# Patient Record
Sex: FEMALE | Race: WHITE | NOT HISPANIC OR LATINO | Employment: STUDENT | URBAN - METROPOLITAN AREA
[De-identification: names, ages, dates, MRNs, and addresses within clinical notes are randomized per-mention and may not be internally consistent; named-entity substitution may affect disease eponyms.]

---

## 2022-10-28 ENCOUNTER — OFFICE VISIT (OUTPATIENT)
Dept: GASTROENTEROLOGY | Facility: CLINIC | Age: 22
End: 2022-10-28

## 2022-10-28 VITALS
WEIGHT: 168 LBS | BODY MASS INDEX: 28.68 KG/M2 | HEIGHT: 64 IN | SYSTOLIC BLOOD PRESSURE: 114 MMHG | DIASTOLIC BLOOD PRESSURE: 67 MMHG | HEART RATE: 80 BPM

## 2022-10-28 DIAGNOSIS — K64.1 GRADE II HEMORRHOIDS: ICD-10-CM

## 2022-10-28 DIAGNOSIS — K92.1 BLOOD IN STOOL: Primary | ICD-10-CM

## 2022-10-28 RX ORDER — DEXTROAMPHETAMINE SACCHARATE, AMPHETAMINE ASPARTATE MONOHYDRATE, DEXTROAMPHETAMINE SULFATE AND AMPHETAMINE SULFATE 1.25; 1.25; 1.25; 1.25 MG/1; MG/1; MG/1; MG/1
5 CAPSULE, EXTENDED RELEASE ORAL DAILY PRN
COMMUNITY

## 2022-10-28 RX ORDER — HYDROCORTISONE 25 MG/G
CREAM TOPICAL 2 TIMES DAILY
Qty: 30 G | Refills: 1 | Status: SHIPPED | OUTPATIENT
Start: 2022-10-28

## 2022-10-28 NOTE — PROGRESS NOTES
[facility] [address] Consultation  Zuhair Ortega 25 y o  female MRN: [id number]  Encounter: [id number]          ASSESSMENT AND PLAN:      1  Blood in stool  -     hydrocortisone (ANUSOL-HC) 2 5 % rectal cream; Apply topically 2 (two) times a day    2  Grade II hemorrhoids      Patient with history of bright red/fresh blood in stools and toilet, painless, occasional constipation, examination today, digital exam unremarkable  No perianal abnormality  Anoscopy was performed, small right sided hemorrhoids seen at 9 o clock position, seems like grade 1  No fissure mass any other pathology seen on this examination  Most likely hemorrhoidal bleeding, advised patient to avoid straining, use some local cream, increase fiber fluid intake to manage the bowels better  If symptoms persist then may need evaluation with colonoscopy and consideration for hemorrhoidal banding  The plan was discussed with patient and her mother, they agree to current management plans  They will call us if not any better  ______________________________________________________________________    HPI:      I thank you for asking me to see this patient for evaluation of her history of blood in stools  She is noted some blood red blood in her bowels, with bowel movements and red color in the toilet for the last couple of months intermittently, no painful defecation  No diarrhea mucousy stools  Moves her bowels every 2-3 days, sometimes strains, denies any excessive hard large or painful BMs  Does not bleed or bruise easy, appetite is fair weight stable, energy level is good  Denies any upper GI symptoms  Denies any history of IBD Crohn's disease colon cancer in the family either  Diet medications more than 10 pertinent systems were reviewed  REVIEW OF SYSTEMS:    CONSTITUTIONAL: Denies any fever, chills, rigors, and weight loss  HEENT: No earache or tinnitus    CARDIOVASCULAR: No chest pain or palpitations  RESPIRATORY: Denies any cough, hemoptysis, shortness of breath or dyspnea on exertion  GASTROINTESTINAL: As noted in the History of Present Illness  GENITOURINARY: Denies any hematuria or dysuria  NEUROLOGIC: No dizziness or vertigo  MUSCULOSKELETAL: Denies any joint swellings  SKIN: Denies skin rashes or itching  ENDOCRINE: Denies excessive thirst  Denies intolerance to heat or cold  PSYCHOSOCIAL: Denies depression or anxiety  Denies any recent memory loss  Historical Information   History reviewed  No pertinent past medical history  History reviewed  No pertinent surgical history  Social History   Social History     Substance and Sexual Activity   Alcohol Use Yes     Social History     Substance and Sexual Activity   Drug Use Never     Social History     Tobacco Use   Smoking Status Never Smoker   Smokeless Tobacco Never Used     History reviewed  No pertinent family history  Meds/Allergies       Current Outpatient Medications:   •  amphetamine-dextroamphetamine (ADDERALL XR, 5MG,) 5 MG 24 hr capsule  •  hydrocortisone (ANUSOL-HC) 2 5 % rectal cream    No Known Allergies        Objective     Blood pressure 114/67, pulse 80, height 5' 4" (1 626 m), weight 76 2 kg (168 lb)  Body mass index is 28 84 kg/m²  PHYSICAL EXAM:      General Appearance:   Alert, cooperative, no distress   HEENT:   Normocephalic, atraumatic, anicteric  Neck:  Supple, symmetrical, trachea midline   Lungs:   Clear to auscultation bilaterally; no rales, rhonchi or wheezing; respirations unlabored    Heart[de-identified]   Regular rate and rhythm; no murmur     Abdomen:   Soft, non-tender, non-distended; normal bowel sounds; no masses, no organomegaly    Genitalia:   Deferred    Rectal:   Deferred    Extremities:  No cyanosis, clubbing or edema    Skin:  No jaundice, rashes, or lesions    Lymph nodes:  No palpable cervical lymphadenopathy        Lab Results:   No visits with results within 1 Day(s) from this visit  Latest known visit with results is:   No results found for any previous visit  Radiology Results:   No results found